# Patient Record
(demographics unavailable — no encounter records)

---

## 2024-10-18 NOTE — ASSESSMENT
[FreeTextEntry1] : 62-year-old female with acute exacerbation of chronic bilateral knee osteoarthritis  Based on patient's symptoms, the worsening nature of the condition, and the lack of response to current treatment options, I diagnosed the patient with severe bilateral knee osteoarthritis, more pronounced on the left side. Plan :  - Therapeutic Interventions : Prescribed 75mg disclofenac dose of anti-inflammatory medication which can be taken by the patient when needed.  - Diagnostic Tests : No new tests ordered.  - Referrals : No referrals at this point.  - Patient Education : Educated the patient about the risks associated with knee replacement surgery such as infection, blood clots, and initial post op pain. Informed that recovery, post surgery, could be around three months.  - Follow-Up : Asked the patient to explore insurance coverage regarding knee replacement surgery, then make another appointment to further discuss the possibility of surgery.

## 2024-10-18 NOTE — HISTORY OF PRESENT ILLNESS
[de-identified] : - Summary : Today, the 62-year-old female patient came for a routine follow-up visit regarding her bilateral knee osteoarthritis. She expressed concern about the severity of her condition, with worsening symptoms particularly in the left knee and inadequate response to current treatment, including anti-inflammatories, physical therapy, and gel therapy. - Chief Complaint (CC) : The primary concern raised by the patient today is the aggravation of her persistent bilateral knee osteoarthritis, with the left knee being more severe. - History of Present Illness : The patient is a 62-year-old female who has been managing bilateral knee osteoarthritis. She is currently reporting worsening symptoms, particularly on the left side. She mentioned that she sought private physical therapy but has not seen significant improvement. She has been on anti-inflammatory medication and Tylenol but remains in discomfort. The patient is considering a knee replacement procedure, contingent upon the associated costs and her insurance coverage. The doctor's assessment indicates that her arthritis is already severe, described as 'bone on bone', and she'd be a suitable candidate for knee replacement therapy. - Past Medical History : Patient has been taking Metoprolol for blood pressure control. The patient denies any heart conditions, heart attacks, of having any stents, or issues with diabetes. - Past Surgical History : The patient has not mentioned any past surgeries.  Occupation and Employment: The patient works in an office.

## 2024-10-18 NOTE — PHYSICAL EXAM
[de-identified] : Bilateral lower extremities  -skin intact - Varus deformities bilaterally correctable partially - Range of motion 5 to 110 degrees - Sensation tact light touch distally - Foot warm well-perfused